# Patient Record
Sex: FEMALE | Race: WHITE | NOT HISPANIC OR LATINO | Employment: OTHER | URBAN - METROPOLITAN AREA
[De-identification: names, ages, dates, MRNs, and addresses within clinical notes are randomized per-mention and may not be internally consistent; named-entity substitution may affect disease eponyms.]

---

## 2023-07-18 ENCOUNTER — OFFICE VISIT (OUTPATIENT)
Dept: PRIMARY CARE CLINIC | Facility: CLINIC | Age: 63
End: 2023-07-18
Payer: MEDICARE

## 2023-07-18 VITALS
OXYGEN SATURATION: 98 % | RESPIRATION RATE: 18 BRPM | WEIGHT: 145 LBS | DIASTOLIC BLOOD PRESSURE: 65 MMHG | HEART RATE: 67 BPM | BODY MASS INDEX: 24.75 KG/M2 | TEMPERATURE: 97 F | SYSTOLIC BLOOD PRESSURE: 95 MMHG | HEIGHT: 64 IN

## 2023-07-18 DIAGNOSIS — M25.561 PAIN AND SWELLING OF RIGHT KNEE: ICD-10-CM

## 2023-07-18 DIAGNOSIS — M54.2 CHRONIC MIDLINE POSTERIOR NECK PAIN: ICD-10-CM

## 2023-07-18 DIAGNOSIS — M25.561 ARTHRALGIA OF RIGHT KNEE: ICD-10-CM

## 2023-07-18 DIAGNOSIS — M25.461 PAIN AND SWELLING OF RIGHT KNEE: ICD-10-CM

## 2023-07-18 DIAGNOSIS — G89.29 CHRONIC MIDLINE POSTERIOR NECK PAIN: ICD-10-CM

## 2023-07-18 DIAGNOSIS — Z00.00 ANNUAL PHYSICAL EXAM: Primary | ICD-10-CM

## 2023-07-18 PROCEDURE — 99204 PR OFFICE/OUTPT VISIT, NEW, LEVL IV, 45-59 MIN: ICD-10-PCS | Mod: S$GLB,,, | Performed by: NURSE PRACTITIONER

## 2023-07-18 PROCEDURE — 99204 OFFICE O/P NEW MOD 45 MIN: CPT | Mod: S$GLB,,, | Performed by: NURSE PRACTITIONER

## 2023-07-18 RX ORDER — MELOXICAM 7.5 MG/1
7.5 TABLET ORAL DAILY
Qty: 30 TABLET | Refills: 2 | Status: SHIPPED | OUTPATIENT
Start: 2023-07-18 | End: 2023-10-31
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
## 2023-07-18 NOTE — PATIENT INSTRUCTIONS
RTC in 3 months for F/U or sooner if needed.    Patient Education       Yearly Physical for Adults   About this topic   Most people do not want to be sick. Having a checkup each year with your doctor is one way to help you stay healthy. You may need to see your doctor more or less often. How often you need to go to the doctor depends on your age. Your family and medical history also play a role in how often you need to go to the doctor. Going to see your doctor on a routine basis can help you find problems early or even before they start. This may make it easier to treat or cure your problem.  General   Your doctor will talk about many things during your checkup. Your doctor may ask about:  Your medical and family history.  All the drugs you are taking. Be sure to include all prescription, over the counter, and herbal supplements. Tell the doctor if you have any drug allergy. Bring a list of drugs you take with you.  How you are feeling and if you are having any problems.  Risky behaviors like smoking, drinking alcohol, using illegal drugs, not wearing seatbelts, having unprotected sex, etc.  Your doctor will do a physical exam and may check your:  Height and weight  Blood pressure  Reflexes  Memory  Vision  Hearing  Your doctor may order:  Lab tests  ECG to check your heart rhythm  X-rays  Tests or treatments based on your exam  What lifestyle changes are needed?   Your doctor may suggest you make changes to your lifestyle at this visit. The doctor may talk with you about being more active or lowering stress levels. Ask your doctor what you need to do.  What drugs may be needed?   Your doctor may order drugs or vaccines to protect you from illnesses.  What changes to diet are needed?   Talk to your doctor to see if any changes are needed to your diet.  When do I need to call the doctor?   Call your doctor if you need to learn about any test results. Together you can make a plan for more care.  Helpful tips   Make a  list of questions for your doctor before you go. This will help you remember to ask about any concerns. Write down any answers from your doctor so you can look over them after your visit.   Tell your doctor about any changes in your body or health since your last visit.  Ask your doctor about any screening tests you need.  Where can I learn more?   [employer] Academy [employer]  http://familydoctor.org/familydoctor/en/prevention-wellness/staying-healthy/healthy-living/preventive-services-for-healthy-living.printerview.html   Centers for Disease Control  http://www.cdc.gov/family/checkup/   Last Reviewed Date   2019-04-22  Consumer Information Use and Disclaimer   This information is not specific medical advice and does not replace information you receive from your health care provider. This is only a brief summary of general information. It does NOT include all information about conditions, illnesses, injuries, tests, procedures, treatments, therapies, discharge instructions or life-style choices that may apply to you. You must talk with your health care provider for complete information about your health and treatment options. This information should not be used to decide whether or not to accept your health care providers advice, instructions or recommendations. Only your health care provider has the knowledge and training to provide advice that is right for you.  Copyright   Copyright © 2021 [employer] and its affiliates and/or licensors. All rights reserved.

## 2023-07-19 DIAGNOSIS — Z12.31 OTHER SCREENING MAMMOGRAM: ICD-10-CM

## 2023-07-20 ENCOUNTER — TELEPHONE (OUTPATIENT)
Dept: PRIMARY CARE CLINIC | Facility: CLINIC | Age: 63
End: 2023-07-20
Payer: MEDICARE

## 2023-07-20 NOTE — TELEPHONE ENCOUNTER
----- Message from Cintia Lamb NP sent at 7/20/2023  1:19 PM CDT -----  Please notify patient her neck and knee XRAYS both show she has arthritis so tell her to continue taking the meloxicam daily to help with the arthritis pain and swelling and if her pain continues even with the medication, she can see an Orthopedic at [facility] because they accept her insurance.

## 2023-10-31 ENCOUNTER — HOSPITAL ENCOUNTER (OUTPATIENT)
Dept: RADIOLOGY | Facility: HOSPITAL | Age: 63
Discharge: HOME | End: 2023-10-31
Attending: OTOLARYNGOLOGY
Payer: MEDICARE

## 2023-10-31 ENCOUNTER — CLINICAL SUPPORT (OUTPATIENT)
Dept: RESPIRATORY THERAPY | Facility: HOSPITAL | Age: 63
End: 2023-10-31
Attending: OTOLARYNGOLOGY
Payer: MEDICARE

## 2023-10-31 DIAGNOSIS — Z01.811 PRE-OP CHEST EXAM: ICD-10-CM

## 2023-10-31 DIAGNOSIS — Z01.818 PREOP EXAMINATION: Primary | ICD-10-CM

## 2023-10-31 DIAGNOSIS — Z01.818 PREOP EXAMINATION: ICD-10-CM

## 2023-10-31 PROCEDURE — 71046 X-RAY EXAM CHEST 2 VIEWS: CPT | Mod: TC

## 2023-10-31 PROCEDURE — 93005 ELECTROCARDIOGRAM TRACING: CPT

## 2023-10-31 NOTE — DISCHARGE INSTRUCTIONS
KEEP HEADWRAP/WAN DRESSING ON FOR 2 DAYS.     IF EAR DRAINING/BLEEDING OCCURS THE COTTON BALL IN OUTER EAR MAY BE REPLACED IF NEEDED.     LIQUID DIET AND MAY ADVANCE IF TOLERATING TO REGULAR DIET.     PLEASE FOLLOW POST OP INSTRUCTION SHEET FROM THE OFFICE

## 2023-11-09 ENCOUNTER — ANESTHESIA EVENT (OUTPATIENT)
Dept: SURGERY | Facility: HOSPITAL | Age: 63
End: 2023-11-09
Payer: MEDICARE

## 2023-11-09 NOTE — ANESTHESIA PREPROCEDURE EVALUATION
11/09/2023  Keshia Pope is a 63 y.o., female.      Pre-op Assessment    I have reviewed the Patient Summary Reports.     I have reviewed the Nursing Notes. I have reviewed the NPO Status.   I have reviewed the Medications.     Review of Systems  Anesthesia Hx:  Denies Family Hx of Anesthesia complications.   Denies Personal Hx of Anesthesia complications.   Social:  Non-Smoker, No Alcohol Use    Hematology/Oncology:  Hematology Normal   Oncology Normal     EENT/Dental:EENT/Dental Normal   Cardiovascular:  Cardiovascular Normal  ECG has been reviewed.    Pulmonary:  Pulmonary Normal    Renal/:  Renal/ Normal     Hepatic/GI:  Hepatic/GI Normal    Musculoskeletal:  Musculoskeletal Normal    Neurological:  Neurology Normal    Endocrine:  Endocrine Normal    Dermatological:  Skin Normal    Psych:  Psychiatric Normal           Physical Exam  General: Cooperative, Alert and Oriented    Airway:  Mallampati: II   Mouth Opening: Normal  TM Distance: Normal  Tongue: Normal  Neck ROM: Normal ROM    Dental:  Intact        Anesthesia Plan  Type of Anesthesia, risks & benefits discussed:    Anesthesia Type: Gen ETT  Intra-op Monitoring Plan: Standard ASA Monitors  Post Op Pain Control Plan: multimodal analgesia  Induction:  IV  Airway Plan: Direct  Informed Consent: Informed consent signed with the Patient and all parties understand the risks and agree with anesthesia plan.  All questions answered. Patient consented to blood products? Yes  ASA Score: 3    Ready For Surgery From Anesthesia Perspective.     .

## 2023-11-10 ENCOUNTER — HOSPITAL ENCOUNTER (OUTPATIENT)
Facility: HOSPITAL | Age: 63
Discharge: HOME | End: 2023-11-10
Attending: OTOLARYNGOLOGY | Admitting: OTOLARYNGOLOGY
Payer: MEDICARE

## 2023-11-10 ENCOUNTER — ANESTHESIA (OUTPATIENT)
Dept: SURGERY | Facility: HOSPITAL | Age: 63
End: 2023-11-10
Payer: MEDICARE

## 2023-11-10 VITALS
HEART RATE: 82 BPM | TEMPERATURE: 97 F | OXYGEN SATURATION: 94 % | HEIGHT: 64 IN | WEIGHT: 145 LBS | DIASTOLIC BLOOD PRESSURE: 62 MMHG | SYSTOLIC BLOOD PRESSURE: 101 MMHG | BODY MASS INDEX: 24.75 KG/M2 | RESPIRATION RATE: 17 BRPM

## 2023-11-10 DIAGNOSIS — H72.91 PERFORATED TYMPANIC MEMBRANE, RIGHT: ICD-10-CM

## 2023-11-10 PROCEDURE — 71000015 HC POSTOP RECOV 1ST HR: Performed by: OTOLARYNGOLOGY

## 2023-11-10 PROCEDURE — 63600175 PHARM REV CODE 636 W HCPCS: Performed by: NURSE ANESTHETIST, CERTIFIED REGISTERED

## 2023-11-10 PROCEDURE — 37000008 HC ANESTHESIA 1ST 15 MINUTES: Performed by: OTOLARYNGOLOGY

## 2023-11-10 PROCEDURE — 36000708 HC OR TIME LEV III 1ST 15 MIN: Performed by: OTOLARYNGOLOGY

## 2023-11-10 PROCEDURE — 37000009 HC ANESTHESIA EA ADD 15 MINS: Performed by: OTOLARYNGOLOGY

## 2023-11-10 PROCEDURE — 25000003 PHARM REV CODE 250

## 2023-11-10 PROCEDURE — 25000003 PHARM REV CODE 250: Performed by: ANESTHESIOLOGY

## 2023-11-10 PROCEDURE — D9220A PRA ANESTHESIA: Mod: ,,, | Performed by: NURSE ANESTHETIST, CERTIFIED REGISTERED

## 2023-11-10 PROCEDURE — 25000003 PHARM REV CODE 250: Performed by: NURSE ANESTHETIST, CERTIFIED REGISTERED

## 2023-11-10 PROCEDURE — 63600175 PHARM REV CODE 636 W HCPCS: Performed by: ANESTHESIOLOGY

## 2023-11-10 PROCEDURE — 25000003 PHARM REV CODE 250: Performed by: OTOLARYNGOLOGY

## 2023-11-10 PROCEDURE — 71000016 HC POSTOP RECOV ADDL HR: Performed by: OTOLARYNGOLOGY

## 2023-11-10 PROCEDURE — 36000709 HC OR TIME LEV III EA ADD 15 MIN: Performed by: OTOLARYNGOLOGY

## 2023-11-10 PROCEDURE — 63600175 PHARM REV CODE 636 W HCPCS: Performed by: OTOLARYNGOLOGY

## 2023-11-10 PROCEDURE — D9220A PRA ANESTHESIA: ICD-10-PCS | Mod: ,,, | Performed by: NURSE ANESTHETIST, CERTIFIED REGISTERED

## 2023-11-10 PROCEDURE — 71000033 HC RECOVERY, INTIAL HOUR: Performed by: OTOLARYNGOLOGY

## 2023-11-10 RX ORDER — GABAPENTIN 300 MG/1
600 CAPSULE ORAL
Status: COMPLETED | OUTPATIENT
Start: 2023-11-10 | End: 2023-11-10

## 2023-11-10 RX ORDER — LIDOCAINE HYDROCHLORIDE 20 MG/ML
INJECTION, SOLUTION EPIDURAL; INFILTRATION; INTRACAUDAL; PERINEURAL
Status: DISCONTINUED | OUTPATIENT
Start: 2023-11-10 | End: 2023-11-10

## 2023-11-10 RX ORDER — PROPOFOL 10 MG/ML
VIAL (ML) INTRAVENOUS
Status: DISCONTINUED | OUTPATIENT
Start: 2023-11-10 | End: 2023-11-10

## 2023-11-10 RX ORDER — ACETAMINOPHEN 500 MG
1000 TABLET ORAL
Status: COMPLETED | OUTPATIENT
Start: 2023-11-10 | End: 2023-11-10

## 2023-11-10 RX ORDER — OFLOXACIN 3 MG/ML
SOLUTION/ DROPS OPHTHALMIC
Status: DISCONTINUED | OUTPATIENT
Start: 2023-11-10 | End: 2023-11-10 | Stop reason: HOSPADM

## 2023-11-10 RX ORDER — EPHEDRINE SULFATE 50 MG/ML
INJECTION, SOLUTION INTRAVENOUS
Status: DISCONTINUED | OUTPATIENT
Start: 2023-11-10 | End: 2023-11-10

## 2023-11-10 RX ORDER — CEFAZOLIN SODIUM 2 G/50ML
2 SOLUTION INTRAVENOUS
Status: COMPLETED | OUTPATIENT
Start: 2023-11-10 | End: 2023-11-10

## 2023-11-10 RX ORDER — KETOROLAC TROMETHAMINE 30 MG/ML
INJECTION, SOLUTION INTRAMUSCULAR; INTRAVENOUS
Status: DISCONTINUED | OUTPATIENT
Start: 2023-11-10 | End: 2023-11-10

## 2023-11-10 RX ORDER — HYDROMORPHONE HYDROCHLORIDE 2 MG/ML
0.2 INJECTION, SOLUTION INTRAMUSCULAR; INTRAVENOUS; SUBCUTANEOUS EVERY 5 MIN PRN
Status: DISCONTINUED | OUTPATIENT
Start: 2023-11-10 | End: 2023-11-10 | Stop reason: HOSPADM

## 2023-11-10 RX ORDER — DEXAMETHASONE SODIUM PHOSPHATE 4 MG/ML
INJECTION, SOLUTION INTRA-ARTICULAR; INTRALESIONAL; INTRAMUSCULAR; INTRAVENOUS; SOFT TISSUE
Status: DISCONTINUED | OUTPATIENT
Start: 2023-11-10 | End: 2023-11-10

## 2023-11-10 RX ORDER — ROCURONIUM BROMIDE 10 MG/ML
INJECTION, SOLUTION INTRAVENOUS
Status: DISCONTINUED | OUTPATIENT
Start: 2023-11-10 | End: 2023-11-10

## 2023-11-10 RX ORDER — LIDOCAINE HYDROCHLORIDE AND EPINEPHRINE 10; 10 MG/ML; UG/ML
INJECTION, SOLUTION INFILTRATION; PERINEURAL
Status: DISCONTINUED | OUTPATIENT
Start: 2023-11-10 | End: 2023-11-10 | Stop reason: HOSPADM

## 2023-11-10 RX ORDER — SODIUM CHLORIDE, SODIUM LACTATE, POTASSIUM CHLORIDE, CALCIUM CHLORIDE 600; 310; 30; 20 MG/100ML; MG/100ML; MG/100ML; MG/100ML
INJECTION, SOLUTION INTRAVENOUS CONTINUOUS
Status: DISCONTINUED | OUTPATIENT
Start: 2023-11-10 | End: 2023-11-10 | Stop reason: HOSPADM

## 2023-11-10 RX ORDER — ONDANSETRON 2 MG/ML
INJECTION INTRAMUSCULAR; INTRAVENOUS
Status: DISCONTINUED | OUTPATIENT
Start: 2023-11-10 | End: 2023-11-10

## 2023-11-10 RX ORDER — HYDROCODONE BITARTRATE AND ACETAMINOPHEN 7.5; 325 MG/1; MG/1
1 TABLET ORAL EVERY 4 HOURS PRN
Status: DISCONTINUED | OUTPATIENT
Start: 2023-11-10 | End: 2023-11-10 | Stop reason: HOSPADM

## 2023-11-10 RX ORDER — FENTANYL CITRATE 50 UG/ML
INJECTION, SOLUTION INTRAMUSCULAR; INTRAVENOUS
Status: DISCONTINUED | OUTPATIENT
Start: 2023-11-10 | End: 2023-11-10

## 2023-11-10 RX ORDER — BACITRACIN ZINC 500 UNIT/G
OINTMENT (GRAM) TOPICAL
Status: DISCONTINUED | OUTPATIENT
Start: 2023-11-10 | End: 2023-11-10 | Stop reason: HOSPADM

## 2023-11-10 RX ORDER — SODIUM CHLORIDE 0.9 % (FLUSH) 0.9 %
10 SYRINGE (ML) INJECTION
Status: DISCONTINUED | OUTPATIENT
Start: 2023-11-10 | End: 2023-11-10 | Stop reason: HOSPADM

## 2023-11-10 RX ORDER — FENTANYL CITRATE 50 UG/ML
25 INJECTION, SOLUTION INTRAMUSCULAR; INTRAVENOUS EVERY 5 MIN PRN
Status: DISCONTINUED | OUTPATIENT
Start: 2023-11-10 | End: 2023-11-10 | Stop reason: HOSPADM

## 2023-11-10 RX ADMIN — DEXAMETHASONE SODIUM PHOSPHATE 10 MG: 4 INJECTION, SOLUTION INTRA-ARTICULAR; INTRALESIONAL; INTRAMUSCULAR; INTRAVENOUS; SOFT TISSUE at 08:11

## 2023-11-10 RX ADMIN — PROPOFOL 80 MG: 10 INJECTION, EMULSION INTRAVENOUS at 07:11

## 2023-11-10 RX ADMIN — EPHEDRINE SULFATE 20 MG: 50 INJECTION INTRAVENOUS at 08:11

## 2023-11-10 RX ADMIN — EPHEDRINE SULFATE 25 MG: 50 INJECTION, SOLUTION INTRAVENOUS at 08:11

## 2023-11-10 RX ADMIN — EPHEDRINE SULFATE 10 MG: 50 INJECTION INTRAVENOUS at 08:11

## 2023-11-10 RX ADMIN — CEFAZOLIN SODIUM 2 G: 2 SOLUTION INTRAVENOUS at 07:11

## 2023-11-10 RX ADMIN — LIDOCAINE HYDROCHLORIDE 100 MG: 20 INJECTION, SOLUTION EPIDURAL; INFILTRATION; INTRACAUDAL; PERINEURAL at 07:11

## 2023-11-10 RX ADMIN — ONDANSETRON 4 MG: 2 INJECTION INTRAMUSCULAR; INTRAVENOUS at 08:11

## 2023-11-10 RX ADMIN — GABAPENTIN 600 MG: 300 CAPSULE ORAL at 07:11

## 2023-11-10 RX ADMIN — KETOROLAC TROMETHAMINE 30 MG: 30 INJECTION, SOLUTION INTRAMUSCULAR at 09:11

## 2023-11-10 RX ADMIN — ROCURONIUM BROMIDE 30 MG: 10 INJECTION, SOLUTION INTRAVENOUS at 07:11

## 2023-11-10 RX ADMIN — FENTANYL CITRATE 50 MCG: 50 INJECTION, SOLUTION INTRAMUSCULAR; INTRAVENOUS at 07:11

## 2023-11-10 RX ADMIN — SODIUM CHLORIDE, POTASSIUM CHLORIDE, SODIUM LACTATE AND CALCIUM CHLORIDE: 600; 310; 30; 20 INJECTION, SOLUTION INTRAVENOUS at 10:11

## 2023-11-10 RX ADMIN — SODIUM CHLORIDE, POTASSIUM CHLORIDE, SODIUM LACTATE AND CALCIUM CHLORIDE: 600; 310; 30; 20 INJECTION, SOLUTION INTRAVENOUS at 07:11

## 2023-11-10 RX ADMIN — SUGAMMADEX 200 MG: 100 INJECTION, SOLUTION INTRAVENOUS at 09:11

## 2023-11-10 RX ADMIN — EPHEDRINE SULFATE 20 MG: 50 INJECTION INTRAVENOUS at 07:11

## 2023-11-10 RX ADMIN — ACETAMINOPHEN 1000 MG: 500 TABLET, FILM COATED ORAL at 07:11

## 2023-11-10 NOTE — ANESTHESIA PROCEDURE NOTES
Intubation    Date/Time: 11/10/2023 7:52 AM    Performed by: Poli Carlin CRNA  Authorized by: Carl Garcia DO    Intubation:     Induction:  Intravenous    Intubated:  Postinduction    Mask Ventilation:  Easy mask    Attempts:  1    Attempted By:  Student    Method of Intubation:  Direct    Blade:  Reagan 2    Laryngeal View Grade: Grade I - full view of cords      Difficult Airway Encountered?: No      Complications:  None    Airway Device:  Oral endotracheal tube    Airway Device Size:  7.0    Style/Cuff Inflation:  Cuffed (inflated to minimal occlusive pressure)    Inflation Amount (mL):  5    Secured at:  The lips    Placement Verified By:  Capnometry and Colorimetric ETCO2 device    Complicating Factors:  None    Findings Post-Intubation:  BS equal bilateral and atraumatic/condition of teeth unchanged

## 2023-11-10 NOTE — DISCHARGE SUMMARY
DISCHARGE SUMMARY     ADMISSION DATE: 11/10/23    ADMITTING DIAGNOSIS:  Right tympanic membrane perforation    DISCHARGE DATE: 11/10/23      DISCHARGE DIAGNOSIS:  Same     PROCEDURE RESULTS:  Successful surgery without complication.     DISPOSITION:  Home with self-care.     DISCHARGE CONDITION:  Stable.     DISCHARGE INSTRUCTIONS:  Please find provided discharge instructions in   home-going patient folder.  Follow up 2 weeks in clinic.           ______________________________  Vinay Parada Jr, MD

## 2023-11-10 NOTE — H&P
HISTORY AND PHYSICAL     PREOPERATIVE DIAGNOSIS:  R TM perf     HISTORY OF PRESENT ILLNESS:  Patient presents to OR for procedure.    Past Medical History:   Diagnosis Date    No known health problems        Past Surgical History:   Procedure Laterality Date    CATARACT EXTRACTION BILATERAL W/ ANTERIOR VITRECTOMY Bilateral     CHOLECYSTECTOMY      TYMPANOSTOMY TUBE PLACEMENT Left        Family History   Problem Relation Age of Onset    Diabetes Mother     No Known Problems Father        Social History     Socioeconomic History    Marital status:    Tobacco Use    Smoking status: Never    Smokeless tobacco: Never   Substance and Sexual Activity    Alcohol use: Never    Drug use: Never    Sexual activity: Not Currently       Current Facility-Administered Medications   Medication Dose Route Frequency Provider Last Rate Last Admin    cefazolin (ANCEF) 2 gram in dextrose 5% 50 mL IVPB (premix)  2 g Intravenous Once Pre-Op Vinay Parada Jr., MD        lactated ringers infusion   Intravenous Continuous Carl Garcia, DO 10 mL/hr at 11/10/23 0707 New Bag at 11/10/23 0707       Review of patient's allergies indicates:  No Known Allergies     REVIEW OF SYSTEMS: Non contributory    PHYSICAL EXAMINATION:  GENERAL:  Well-appearing.  ENT:  Unchanged from previous exam  CARDIOVASCULAR:  Well perfused.  PULMONARY:  Satting well on room air.     ASSESSMENT/PLAN:  Proceed to OR as previously scheduled.           ______________________________  Vinay Parada Jr, MD

## 2023-11-10 NOTE — ANESTHESIA POSTPROCEDURE EVALUATION
Anesthesia Post Evaluation    Patient: Keshia Pope    Procedure(s) Performed: Procedure(s) (LRB):  TYMPANOPLASTY (Trans-canal with nerve monitoring & microscope required) (Right)  APPLICATION, CARTILAGE GRAFT (Right)    Final Anesthesia Type: general      Patient location during evaluation: PACU  Patient participation: Yes- Able to Participate  Level of consciousness: awake and alert  Post-procedure vital signs: reviewed and stable  Pain management: adequate  Airway patency: patent  TRACEE mitigation strategies: Multimodal analgesia and Verification of full reversal of neuromuscular block  PONV status at discharge: No PONV  Anesthetic complications: no      Cardiovascular status: blood pressure returned to baseline  Respiratory status: unassisted  Hydration status: euvolemic  Follow-up not needed.          Vitals Value Taken Time   /63 11/10/23 0959   Temp 36.2 11/10/23 1001   Pulse 95 11/10/23 1000   Resp 15 11/10/23 1000   SpO2 96 % 11/10/23 1000   Vitals shown include unvalidated device data.      No case tracking events are documented in the log.      Pain/Kike Score: Pain Rating Prior to Med Admin: 0 (11/10/2023  7:06 AM)

## 2023-11-10 NOTE — OP NOTE
OPERATIVE REPORT     DATE: 11/10/23    SURGEON:  Vinay Parada Jr, MD     PROCEDURE PERFORMED:    Right transcanal cartilage tympanoplasty  Cartilage graft     PREOPERATIVE DIAGNOSIS:    Right tympanic membrane perforation     INDICATIONS:  Evaluation and treatment.     COMPLICATIONS:  None.     BLOOD LOSS:  Minimal.    FINDINGS: Right approx 60% tympanic membrane perforation with adjacent tympanosclerosis     DETAILS OF PROCEDURE:  Patient was brought to the operating room and identified by name and clinic number. After an adequate plane of general endotracheal anesthesia was successfully obtained by the Anesthesia Service, the table was turned and the patient was prepped and draped in the usual fashion for ear surgery. Bipolar orbicularis oculi and orbicularis oris facial nerve monitoring electrodes were placed for continuous intraoperative seventh nerve monitoring. The ear was then examined and 1% lidocaine 1:100,000 with epinephrine was injected for hemostasis and anesthetic affect. A  transcanal incision was then performed and the tympanomeatal flap was elevated. The middle ear space was entered. The chorda tympani nerve was identified and preserved.  The ossicular chain was palpated and was intact.  Tragal cartilage was harvested and was fashioned into a total  cartilage flap.  The tympanomeatal flap was replaced and the reconstruction was noted to be in good position.  Gelfoam was placed in the middle ear and in the ear canal. Ointment was placed in the canal, a cotton ball was placed and a Montezuma dressing was applied. This marked the end of our procedure. Standard procedural protocols and universal precautions were utilized throughout the entire procedure.

## 2024-01-31 DIAGNOSIS — Z78.0 MENOPAUSE: ICD-10-CM

## 2024-02-06 DIAGNOSIS — Z12.11 COLON CANCER SCREENING: ICD-10-CM

## (undated) DEVICE — BLADE SURG STAINLESS STEEL #10

## (undated) DEVICE — NDL HYPO REG 25G X 1 1/2

## (undated) DEVICE — Device

## (undated) DEVICE — SUT COATED VICRYL 3-0 1X18

## (undated) DEVICE — GLOVE PROTEXIS HYDROGEL SZ7.5

## (undated) DEVICE — DRAPE MICROSCOPE 41X66IN

## (undated) DEVICE — SOL 9P NACL IRR PIC IL

## (undated) DEVICE — NDL HYPO 27G X 1 1/2

## (undated) DEVICE — CORD BIPOLAR 12 FOOT

## (undated) DEVICE — ADHESIVE MASTISOL VIAL 48/BX

## (undated) DEVICE — SUPPORT ULNA NERVE PROTECTOR

## (undated) DEVICE — SYR 3CC LUER LOC

## (undated) DEVICE — SPONGE SURGIFOAM GELATIN SZ50

## (undated) DEVICE — TUBING SUC MEDI-VAC CONN 6FT

## (undated) DEVICE — COVER PROXIMA MAYO STAND

## (undated) DEVICE — BLANKET SNUGGLE WARM LOWER BDY

## (undated) DEVICE — SUT 5/0 18IN PLAIN FAST AB

## (undated) DEVICE — SOCKINETTE IMPERVIOUS 12X48IN

## (undated) DEVICE — CATH PROTECTIV 14G 1.25IN

## (undated) DEVICE — DRAPE STERI INCISE 17X23IN

## (undated) DEVICE — SYR 10CC LUER LOCK

## (undated) DEVICE — DRESSING GLASSCOCK PED MASTOID